# Patient Record
Sex: MALE | Race: WHITE | NOT HISPANIC OR LATINO | Employment: UNEMPLOYED | URBAN - METROPOLITAN AREA
[De-identification: names, ages, dates, MRNs, and addresses within clinical notes are randomized per-mention and may not be internally consistent; named-entity substitution may affect disease eponyms.]

---

## 2017-02-16 ENCOUNTER — TELEPHONE (OUTPATIENT)
Dept: NEUROSURGERY | Facility: CLINIC | Age: 67
End: 2017-02-16

## 2017-02-16 DIAGNOSIS — I65.23 BILATERAL CAROTID ARTERY STENOSIS: Primary | ICD-10-CM

## 2017-02-16 NOTE — TELEPHONE ENCOUNTER
Provider:  Not in EPIC  Caller:   Message from Kristin  Phone #:  [phone]. [phone]  Surgery:  no  Surgery Date: no   Last visit:   Not in [facility]    MAYRA:         Reason for call:           ----- Message from Reba Gonzalez sent at 2/16/2017  1:55 PM EST -----  NEED ORDER FOR YEARLY FI CAROTID US.  THANKS

## 2017-03-10 RX ORDER — LISINOPRIL 5 MG/1
5 TABLET ORAL DAILY
COMMUNITY

## 2017-03-10 RX ORDER — ASPIRIN 81 MG/1
81 TABLET ORAL DAILY
COMMUNITY

## 2017-03-10 RX ORDER — AMLODIPINE BESYLATE 10 MG/1
10 TABLET ORAL DAILY
COMMUNITY
End: 2017-03-13

## 2017-03-10 RX ORDER — ATORVASTATIN CALCIUM 40 MG/1
40 TABLET, FILM COATED ORAL DAILY
COMMUNITY
End: 2017-03-13

## 2017-03-13 ENCOUNTER — HOSPITAL ENCOUNTER (OUTPATIENT)
Dept: CARDIOLOGY | Facility: HOSPITAL | Age: 67
Discharge: HOME | End: 2017-03-13
Admitting: PHYSICIAN ASSISTANT

## 2017-03-13 ENCOUNTER — OFFICE VISIT (OUTPATIENT)
Dept: NEUROSURGERY | Facility: CLINIC | Age: 67
End: 2017-03-13

## 2017-03-13 VITALS
HEIGHT: 72 IN | WEIGHT: 199.8 LBS | RESPIRATION RATE: 20 BRPM | SYSTOLIC BLOOD PRESSURE: 116 MMHG | OXYGEN SATURATION: 98 % | TEMPERATURE: 99.1 F | HEART RATE: 88 BPM | BODY MASS INDEX: 27.06 KG/M2 | DIASTOLIC BLOOD PRESSURE: 78 MMHG

## 2017-03-13 DIAGNOSIS — I65.23 BILATERAL CAROTID ARTERY STENOSIS: ICD-10-CM

## 2017-03-13 DIAGNOSIS — I65.21 STENOSIS OF RIGHT CAROTID ARTERY: Primary | ICD-10-CM

## 2017-03-13 LAB
BH CV XLRA MEAS LEFT DIST CCA EDV: 29.1 CM/SEC
BH CV XLRA MEAS LEFT DIST CCA PSV: 87.2 CM/SEC
BH CV XLRA MEAS LEFT DIST ICA EDV: 47.1 CM/SEC
BH CV XLRA MEAS LEFT DIST ICA PSV: 116 CM/SEC
BH CV XLRA MEAS LEFT ICA/CCA RATIO: 1.1
BH CV XLRA MEAS LEFT MID ICA EDV: 44 CM/SEC
BH CV XLRA MEAS LEFT MID ICA PSV: 102 CM/SEC
BH CV XLRA MEAS LEFT PROX CCA EDV: 33 CM/SEC
BH CV XLRA MEAS LEFT PROX CCA PSV: 101 CM/SEC
BH CV XLRA MEAS LEFT PROX ECA EDV: 19.6 CM/SEC
BH CV XLRA MEAS LEFT PROX ECA PSV: 110 CM/SEC
BH CV XLRA MEAS LEFT PROX ICA EDV: 35.4 CM/SEC
BH CV XLRA MEAS LEFT PROX ICA PSV: 91.9 CM/SEC
BH CV XLRA MEAS LEFT PROX SCLA PSV: 134 CM/SEC
BH CV XLRA MEAS LEFT VERTEBRAL A EDV: 9.4 CM/SEC
BH CV XLRA MEAS LEFT VERTEBRAL A PSV: 31.4 CM/SEC
BH CV XLRA MEAS RIGHT DIST CCA EDV: 33.8 CM/SEC
BH CV XLRA MEAS RIGHT DIST CCA PSV: 108 CM/SEC
BH CV XLRA MEAS RIGHT DIST ICA EDV: 33 CM/SEC
BH CV XLRA MEAS RIGHT DIST ICA PSV: 84.1 CM/SEC
BH CV XLRA MEAS RIGHT ICA/CCA RATIO: 0.7
BH CV XLRA MEAS RIGHT MID ICA EDV: 27.5 CM/SEC
BH CV XLRA MEAS RIGHT MID ICA PSV: 69.1 CM/SEC
BH CV XLRA MEAS RIGHT PROX CCA EDV: 22 CM/SEC
BH CV XLRA MEAS RIGHT PROX CCA PSV: 104 CM/SEC
BH CV XLRA MEAS RIGHT PROX ECA EDV: 22 CM/SEC
BH CV XLRA MEAS RIGHT PROX ECA PSV: 108 CM/SEC
BH CV XLRA MEAS RIGHT PROX ICA EDV: 30.6 CM/SEC
BH CV XLRA MEAS RIGHT PROX ICA PSV: 79.4 CM/SEC
BH CV XLRA MEAS RIGHT PROX SCLA PSV: 132 CM/SEC
BH CV XLRA MEAS RIGHT VERTEBRAL A EDV: 14.1 CM/SEC
BH CV XLRA MEAS RIGHT VERTEBRAL A PSV: 31.4 CM/SEC
LEFT ARM BP: NORMAL MMHG
RIGHT ARM BP: NORMAL MMHG

## 2017-03-13 PROCEDURE — 93880 EXTRACRANIAL BILAT STUDY: CPT

## 2017-03-13 PROCEDURE — 93880 EXTRACRANIAL BILAT STUDY: CPT | Performed by: INTERNAL MEDICINE

## 2017-03-13 PROCEDURE — 99212 OFFICE O/P EST SF 10 MIN: CPT | Performed by: NEUROLOGICAL SURGERY

## 2017-03-13 RX ORDER — HYDROCODONE BITARTRATE AND ACETAMINOPHEN 7.5; 325 MG/1; MG/1
TABLET ORAL
Refills: 0 | COMMUNITY
Start: 2017-03-07

## 2017-03-13 RX ORDER — CLARITHROMYCIN 500 MG/1
TABLET, COATED ORAL
Refills: 0 | COMMUNITY
Start: 2017-02-27 | End: 2017-03-13

## 2017-03-13 RX ORDER — POLYETHYLENE GLYCOL 3350 17 G/17G
POWDER, FOR SOLUTION ORAL
Refills: 0 | COMMUNITY
Start: 2017-02-06 | End: 2017-03-13

## 2017-03-13 RX ORDER — BISACODYL 5 MG
TABLET, DELAYED RELEASE (ENTERIC COATED) ORAL
Refills: 0 | COMMUNITY
Start: 2017-02-06 | End: 2017-03-13

## 2017-03-13 RX ORDER — ATORVASTATIN CALCIUM 10 MG/1
TABLET, FILM COATED ORAL
Refills: 6 | COMMUNITY
Start: 2017-03-07 | End: 2023-03-13

## 2017-03-13 RX ORDER — QUETIAPINE FUMARATE 50 MG/1
TABLET, FILM COATED ORAL
Refills: 3 | COMMUNITY
Start: 2017-01-06

## 2017-03-13 RX ORDER — AMOXICILLIN 500 MG/1
CAPSULE ORAL
Refills: 0 | COMMUNITY
Start: 2017-02-27 | End: 2017-03-13

## 2017-03-13 RX ORDER — OMEPRAZOLE 20 MG/1
CAPSULE, DELAYED RELEASE ORAL
Refills: 0 | COMMUNITY
Start: 2017-02-27 | End: 2017-03-13

## 2017-03-13 NOTE — PROGRESS NOTES
NAME: CARMEN GRANT   DOS: 3/13/2017  : 1950  PCP: David Frank MD    Chief Complaint:  No chief complaint on file.    Follow-up for likely symptomatic right carotid stenosis  History of Present Illness:  67 y.o. male     Gentleman's a follow-up from a right-sided carotid endarterectomy that was unremarkable from over a year ago.  He had some visual complaints at that time but nothing that was completely classic for TIA.  He had hematuria as well related to a urinary cancer.    He has no new symptoms here for follow-up with an ultrasound  PMHX  Allergies:  Allergies no known allergies  Medications    Current Outpatient Prescriptions:   •  amLODIPine (NORVASC) 10 MG tablet, Take 10 mg by mouth Daily., Disp: , Rfl:   •  aspirin 81 MG EC tablet, Take 81 mg by mouth Daily., Disp: , Rfl:   •  atorvastatin (LIPITOR) 40 MG tablet, Take 40 mg by mouth Daily., Disp: , Rfl:   •  lisinopril (PRINIVIL,ZESTRIL) 5 MG tablet, Take 5 mg by mouth Daily., Disp: , Rfl:   Past Medical History:  Past Medical History   Diagnosis Date   • Arthritis    • Hearing loss    • Hypertension      Past Surgical History:  Past Surgical History   Procedure Laterality Date   • Carotid endarterectomy  2015     (Dr. Piña)     Social Hx:  Social History   Substance Use Topics   • Smoking status: Never Smoker   • Smokeless tobacco: Never Used   • Alcohol use Yes     Family Hx:  No family history on file.  Review of Systems:        Review of Systems   Constitutional: Negative for activity change, appetite change, chills, diaphoresis, fatigue, fever and unexpected weight change.   HENT: Positive for hearing loss and trouble swallowing. Negative for congestion, dental problem, drooling, ear discharge, ear pain, facial swelling, mouth sores, nosebleeds, postnasal drip, rhinorrhea, sinus pressure, sneezing, sore throat, tinnitus and voice change.    Eyes: Positive for visual disturbance. Negative for photophobia, pain, discharge,  redness and itching.   Respiratory: Negative for apnea, cough, choking, chest tightness, shortness of breath, wheezing and stridor.    Cardiovascular: Negative for chest pain, palpitations and leg swelling.   Gastrointestinal: Negative for abdominal distention, abdominal pain, anal bleeding, blood in stool, constipation, diarrhea, nausea, rectal pain and vomiting.   Endocrine: Negative for cold intolerance, heat intolerance, polydipsia, polyphagia and polyuria.   Genitourinary: Positive for flank pain. Negative for decreased urine volume, difficulty urinating, dysuria, enuresis, frequency, genital sores, hematuria and urgency.   Musculoskeletal: Positive for arthralgias, gait problem, joint swelling, myalgias and neck pain. Negative for back pain and neck stiffness.   Skin: Negative for color change, pallor, rash and wound.   Allergic/Immunologic: Negative for environmental allergies, food allergies and immunocompromised state.   Neurological: Positive for numbness. Negative for dizziness, tremors, seizures, syncope, facial asymmetry, speech difficulty, weakness, light-headedness and headaches.   Hematological: Negative for adenopathy. Does not bruise/bleed easily.   Psychiatric/Behavioral: Positive for sleep disturbance. Negative for agitation, behavioral problems, confusion, decreased concentration, dysphoric mood, hallucinations, self-injury and suicidal ideas. The patient is not nervous/anxious and is not hyperactive.             Physical Examination:  There were no vitals filed for this visit.   General Appearance:   Well developed, well nourished, well groomed, alert, and cooperative.  Neurological examination:  Neurologic Exam  He's awake alert and follows commands is edentulous hard of hearing pupils symmetric  Tongue midline  No evidence of motor drift  Gait is normal incision well-healed  Negative straight leg raise sign 5 out of 5 strength    Review of Imaging/DATA:  Carotid ultrasounds demonstrate  velocities of approximately 100 with ratios approximately 1   Diagnoses/Plan:    Mr. Rhodes is a 67 y.o. male patient doing well status post endarterectomy no new symptomatology from a neurologic standpoint he does have a complaint of a mild sciatica on the left it is very subtle but I did  him on the signs and symptoms to look for to necessitate a referral back.  From endarterectomy standpoint he is to continue statin aspirin and the see him back in a couple years with a follow-up ultrasound.

## 2019-02-18 ENCOUNTER — TELEPHONE (OUTPATIENT)
Dept: NEUROSURGERY | Facility: CLINIC | Age: 69
End: 2019-02-18

## 2019-02-18 DIAGNOSIS — I65.23 BILATERAL CAROTID ARTERY STENOSIS: Primary | ICD-10-CM

## 2019-02-18 NOTE — TELEPHONE ENCOUNTER
Patient called to get scheduled for a F/u. The order in the system has . Can someone put a new one in for me?     Order -  Carotid Duplex    DX -  Stenosis of right carotid artery

## 2019-03-14 ENCOUNTER — OFFICE VISIT (OUTPATIENT)
Dept: NEUROSURGERY | Facility: CLINIC | Age: 69
End: 2019-03-14

## 2019-03-14 ENCOUNTER — HOSPITAL ENCOUNTER (OUTPATIENT)
Dept: CARDIOLOGY | Facility: HOSPITAL | Age: 69
Discharge: HOME | End: 2019-03-14
Admitting: PHYSICIAN ASSISTANT

## 2019-03-14 VITALS
HEIGHT: 72 IN | SYSTOLIC BLOOD PRESSURE: 112 MMHG | DIASTOLIC BLOOD PRESSURE: 68 MMHG | WEIGHT: 195 LBS | TEMPERATURE: 98.5 F | BODY MASS INDEX: 26.41 KG/M2

## 2019-03-14 VITALS — HEIGHT: 72 IN | WEIGHT: 199 LBS | BODY MASS INDEX: 26.95 KG/M2

## 2019-03-14 DIAGNOSIS — Z98.890 H/O CAROTID ENDARTERECTOMY: ICD-10-CM

## 2019-03-14 DIAGNOSIS — I65.23 BILATERAL CAROTID ARTERY STENOSIS: Primary | ICD-10-CM

## 2019-03-14 LAB
BH CV XLRA MEAS LEFT DIST CCA EDV: 22.8 CM/SEC
BH CV XLRA MEAS LEFT DIST CCA PSV: 79.8 CM/SEC
BH CV XLRA MEAS LEFT DIST ICA EDV: 38 CM/SEC
BH CV XLRA MEAS LEFT DIST ICA PSV: 88.8 CM/SEC
BH CV XLRA MEAS LEFT ICA/CCA RATIO: 1.19
BH CV XLRA MEAS LEFT MID CCA EDV: 29.5 CM/SEC
BH CV XLRA MEAS LEFT MID CCA PSV: 96.2 CM/SEC
BH CV XLRA MEAS LEFT MID ICA EDV: 51.8 CM/SEC
BH CV XLRA MEAS LEFT MID ICA PSV: 114.7 CM/SEC
BH CV XLRA MEAS LEFT PROX CCA EDV: 40.1 CM/SEC
BH CV XLRA MEAS LEFT PROX CCA PSV: 137.5 CM/SEC
BH CV XLRA MEAS LEFT PROX ECA EDV: 23.2 CM/SEC
BH CV XLRA MEAS LEFT PROX ECA PSV: 102.6 CM/SEC
BH CV XLRA MEAS LEFT PROX ICA EDV: 42.5 CM/SEC
BH CV XLRA MEAS LEFT PROX ICA PSV: 105.3 CM/SEC
BH CV XLRA MEAS LEFT PROX SCLA EDV: 13.4 CM/SEC
BH CV XLRA MEAS LEFT PROX SCLA PSV: 139.9 CM/SEC
BH CV XLRA MEAS LEFT VERTEBRAL A EDV: 11.1 CM/SEC
BH CV XLRA MEAS LEFT VERTEBRAL A PSV: 41 CM/SEC
BH CV XLRA MEAS RIGHT DIST CCA EDV: 31.4 CM/SEC
BH CV XLRA MEAS RIGHT DIST CCA PSV: 106.9 CM/SEC
BH CV XLRA MEAS RIGHT DIST ICA EDV: 30.4 CM/SEC
BH CV XLRA MEAS RIGHT DIST ICA PSV: 86.9 CM/SEC
BH CV XLRA MEAS RIGHT ICA/CCA RATIO: 0.75
BH CV XLRA MEAS RIGHT MID CCA EDV: 28.9 CM/SEC
BH CV XLRA MEAS RIGHT MID CCA PSV: 113.8 CM/SEC
BH CV XLRA MEAS RIGHT MID ICA EDV: 33.4 CM/SEC
BH CV XLRA MEAS RIGHT MID ICA PSV: 85 CM/SEC
BH CV XLRA MEAS RIGHT PROX CCA EDV: 23.3 CM/SEC
BH CV XLRA MEAS RIGHT PROX CCA PSV: 99.3 CM/SEC
BH CV XLRA MEAS RIGHT PROX ECA EDV: 24.5 CM/SEC
BH CV XLRA MEAS RIGHT PROX ECA PSV: 120.7 CM/SEC
BH CV XLRA MEAS RIGHT PROX ICA EDV: 19.2 CM/SEC
BH CV XLRA MEAS RIGHT PROX ICA PSV: 73.7 CM/SEC
BH CV XLRA MEAS RIGHT PROX SCLA EDV: 0 CM/SEC
BH CV XLRA MEAS RIGHT PROX SCLA PSV: 217 CM/SEC
BH CV XLRA MEAS RIGHT VERTEBRAL A EDV: 14.3 CM/SEC
BH CV XLRA MEAS RIGHT VERTEBRAL A PSV: 34.7 CM/SEC
LEFT ARM BP: NORMAL MMHG
RIGHT ARM BP: NORMAL MMHG

## 2019-03-14 PROCEDURE — 99212 OFFICE O/P EST SF 10 MIN: CPT | Performed by: NEUROLOGICAL SURGERY

## 2019-03-14 PROCEDURE — 93880 EXTRACRANIAL BILAT STUDY: CPT

## 2019-03-14 PROCEDURE — 93880 EXTRACRANIAL BILAT STUDY: CPT | Performed by: INTERNAL MEDICINE

## 2019-03-14 NOTE — PROGRESS NOTES
NAME: CARMEN GRANT   DOS: 3/14/2019  : 1950  PCP: David Frank MD    Chief Complaint:    Chief Complaint   Patient presents with   • HX OF RT- CEA     F/U W/ DUPLEX       History of Present Illness:  69 y.o. male   Gentleman's a follow-up from a right-sided carotid endarterectomy that was unremarkable from over a year ago.  He had some visual complaints at that time but nothing that was completely classic for TIA.  He had hematuria as well related to a urinary cancer.     He has no new symptoms here for follow-up with an ultrasound    No new signs of weakness or problems with strokelike symptoms he is on aspirin and Lipitor  PMHX    PMHX  Allergies:  No Known Allergies  Medications    Current Outpatient Medications:   •  aspirin 81 MG EC tablet, Take 81 mg by mouth Daily., Disp: , Rfl:   •  atorvastatin (LIPITOR) 10 MG tablet, TAKE ONE TABLET BY MOUTH EVERY DAY, Disp: , Rfl: 6  •  HYDROcodone-acetaminophen (NORCO) 7.5-325 MG per tablet, TAKE ONE TABLET BY MOUTH THREE TIMES DAILY AS NEEDED, Disp: , Rfl: 0  •  lisinopril (PRINIVIL,ZESTRIL) 5 MG tablet, Take 5 mg by mouth Daily., Disp: , Rfl:   •  QUEtiapine (SEROquel) 50 MG tablet, TAKE ONE TABLET BY MOUTH EVERY DAY, Disp: , Rfl: 3  Past Medical History:  Past Medical History:   Diagnosis Date   • Arthritis    • H. pylori infection    • Hearing loss    • Hepatitis    • Hypertension    • Prostate cancer (CMS/HCC)      Past Surgical History:  Past Surgical History:   Procedure Laterality Date   • CAROTID ENDARTERECTOMY  2015    (Dr. Piña)   • CAROTID ENDARTERECTOMY  2015    (Dr. Piña)   • COLONOSCOPY       Social Hx:  Social History     Tobacco Use   • Smoking status: Never Smoker   • Smokeless tobacco: Never Used   Substance Use Topics   • Alcohol use: Yes   • Drug use: No     Family Hx:  Family History   Problem Relation Age of Onset   • Breast cancer Mother    • Heart disease Mother    • Heart disease Father    • COPD Father       Review of Systems:        Review of Systems   Constitutional: Negative for activity change, appetite change, chills, diaphoresis, fatigue, fever and unexpected weight change.   HENT: Positive for congestion and rhinorrhea. Negative for dental problem, drooling, ear discharge, ear pain, facial swelling, hearing loss, mouth sores, nosebleeds, postnasal drip, sinus pressure, sneezing, sore throat, tinnitus, trouble swallowing and voice change.    Eyes: Negative for photophobia, pain, discharge, redness, itching and visual disturbance.   Respiratory: Positive for cough. Negative for apnea, choking, chest tightness, shortness of breath, wheezing and stridor.    Cardiovascular: Negative for chest pain, palpitations and leg swelling.   Gastrointestinal: Negative for abdominal distention, abdominal pain, anal bleeding, blood in stool, constipation, diarrhea, nausea, rectal pain and vomiting.   Endocrine: Negative for cold intolerance, heat intolerance, polydipsia, polyphagia and polyuria.   Genitourinary: Negative for decreased urine volume, difficulty urinating, dysuria, enuresis, flank pain, frequency, genital sores, hematuria and urgency.   Musculoskeletal: Positive for back pain. Negative for arthralgias, gait problem, joint swelling, myalgias, neck pain and neck stiffness.   Skin: Negative for color change, pallor, rash and wound.   Allergic/Immunologic: Positive for environmental allergies. Negative for food allergies and immunocompromised state.   Neurological: Negative for dizziness, tremors, seizures, syncope, facial asymmetry, speech difficulty, weakness, light-headedness, numbness and headaches.   Hematological: Negative for adenopathy. Does not bruise/bleed easily.   Psychiatric/Behavioral: Negative for agitation, behavioral problems, confusion, decreased concentration, dysphoric mood, hallucinations, self-injury, sleep disturbance and suicidal ideas. The patient is not nervous/anxious and is not  hyperactive.             Physical Examination:  Vitals:    03/14/19 1242   BP: 112/68   Temp: 98.5 °F (36.9 °C)      General Appearance:   Well developed, well nourished, well groomed, alert, and cooperative.  Neurological examination:  Neurologic Exam  His incision on the right neck is well-healed    He is awake alert and follows commands he has no motor drift his gait is normal  Review of Imaging/DATA:  I reviewed his ultrasound that shows low velocities  Diagnoses/Plan:    Mr. Rhodes is a 69 y.o. male   Lipitor.  I counseled him on the signs and symptoms to look for to necessitate a stroke follow-up.  Overall his carotid ultrasounds look much improved his velocities were in excess of 300 pre-surgically he is doing well plan will be for follow-up ultrasound in 2 years continue aspirin and

## 2021-02-05 ENCOUNTER — TELEPHONE (OUTPATIENT)
Dept: NEUROSURGERY | Facility: CLINIC | Age: 71
End: 2021-02-05

## 2021-02-05 DIAGNOSIS — I65.23 BILATERAL CAROTID ARTERY STENOSIS: Primary | ICD-10-CM

## 2021-02-05 DIAGNOSIS — Z98.890 H/O CAROTID ENDARTERECTOMY: ICD-10-CM

## 2021-02-05 NOTE — TELEPHONE ENCOUNTER
Ultrasound and follow up scheduled. I spoke to Mr. Rhodes. Appointment date and times provided. Appointment reminders and paperwork packet (to update chart) mailed.

## 2021-02-05 NOTE — TELEPHONE ENCOUNTER
Provider:  Wang  Caller:  patient  Time of call:  10:00am  Phone #:  [phone]  Surgery:    Surgery Date:    Last visit:   3/24/19  Next visit:     MAYRA:         Reason for call:   Patient called to schedule his 2-yr follow up with ultrasound. Please pend an order for the carotid ultrasound.

## 2021-03-04 ENCOUNTER — HOSPITAL ENCOUNTER (OUTPATIENT)
Dept: CARDIOLOGY | Facility: HOSPITAL | Age: 71
Discharge: HOME | End: 2021-03-04
Admitting: PHYSICIAN ASSISTANT

## 2021-03-04 ENCOUNTER — OFFICE VISIT (OUTPATIENT)
Dept: NEUROSURGERY | Facility: CLINIC | Age: 71
End: 2021-03-04

## 2021-03-04 VITALS
DIASTOLIC BLOOD PRESSURE: 72 MMHG | HEIGHT: 72 IN | BODY MASS INDEX: 27.25 KG/M2 | SYSTOLIC BLOOD PRESSURE: 136 MMHG | TEMPERATURE: 97.7 F | WEIGHT: 201.2 LBS

## 2021-03-04 DIAGNOSIS — I65.23 BILATERAL CAROTID ARTERY STENOSIS: Primary | ICD-10-CM

## 2021-03-04 DIAGNOSIS — Z98.890 H/O CAROTID ENDARTERECTOMY: ICD-10-CM

## 2021-03-04 DIAGNOSIS — I65.23 BILATERAL CAROTID ARTERY STENOSIS: ICD-10-CM

## 2021-03-04 LAB
BH CV ECHO MEAS - BSA(HAYCOCK): 2.1 M^2
BH CV ECHO MEAS - BSA: 2.1 M^2
BH CV ECHO MEAS - BZI_BMI: 26.4 KILOGRAMS/M^2
BH CV ECHO MEAS - BZI_METRIC_HEIGHT: 182.9 CM
BH CV ECHO MEAS - BZI_METRIC_WEIGHT: 88.5 KG
BH CV XLRA MEAS LEFT DIST CCA EDV: 23.6 CM/SEC
BH CV XLRA MEAS LEFT DIST CCA PSV: 89.6 CM/SEC
BH CV XLRA MEAS LEFT DIST ICA EDV: 49.1 CM/SEC
BH CV XLRA MEAS LEFT DIST ICA PSV: 118.8 CM/SEC
BH CV XLRA MEAS LEFT ICA/CCA RATIO: 1.24
BH CV XLRA MEAS LEFT MID CCA EDV: 27.5 CM/SEC
BH CV XLRA MEAS LEFT MID CCA PSV: 102.9 CM/SEC
BH CV XLRA MEAS LEFT MID ICA EDV: 47.8 CM/SEC
BH CV XLRA MEAS LEFT MID ICA PSV: 127.6 CM/SEC
BH CV XLRA MEAS LEFT PROX CCA EDV: 32.2 CM/SEC
BH CV XLRA MEAS LEFT PROX CCA PSV: 118.6 CM/SEC
BH CV XLRA MEAS LEFT PROX ECA EDV: 20.6 CM/SEC
BH CV XLRA MEAS LEFT PROX ECA PSV: 129.6 CM/SEC
BH CV XLRA MEAS LEFT PROX ICA EDV: 30.8 CM/SEC
BH CV XLRA MEAS LEFT PROX ICA PSV: 97.4 CM/SEC
BH CV XLRA MEAS LEFT PROX SCLA PSV: 106.1 CM/SEC
BH CV XLRA MEAS LEFT VERTEBRAL A EDV: 9.8 CM/SEC
BH CV XLRA MEAS LEFT VERTEBRAL A PSV: 47.5 CM/SEC
BH CV XLRA MEAS RIGHT DIST CCA EDV: 20.7 CM/SEC
BH CV XLRA MEAS RIGHT DIST CCA PSV: 100.6 CM/SEC
BH CV XLRA MEAS RIGHT DIST ICA EDV: 28.9 CM/SEC
BH CV XLRA MEAS RIGHT DIST ICA PSV: 103.1 CM/SEC
BH CV XLRA MEAS RIGHT ICA/CCA RATIO: 0.8
BH CV XLRA MEAS RIGHT MID CCA EDV: 27.1 CM/SEC
BH CV XLRA MEAS RIGHT MID CCA PSV: 121.4 CM/SEC
BH CV XLRA MEAS RIGHT MID ICA EDV: 22 CM/SEC
BH CV XLRA MEAS RIGHT MID ICA PSV: 75.4 CM/SEC
BH CV XLRA MEAS RIGHT PROX CCA EDV: 28.8 CM/SEC
BH CV XLRA MEAS RIGHT PROX CCA PSV: 131 CM/SEC
BH CV XLRA MEAS RIGHT PROX ECA EDV: 19.6 CM/SEC
BH CV XLRA MEAS RIGHT PROX ECA PSV: 140.6 CM/SEC
BH CV XLRA MEAS RIGHT PROX ICA EDV: 24.5 CM/SEC
BH CV XLRA MEAS RIGHT PROX ICA PSV: 96.8 CM/SEC
BH CV XLRA MEAS RIGHT PROX SCLA PSV: 193.1 CM/SEC
BH CV XLRA MEAS RIGHT VERTEBRAL A EDV: 21.2 CM/SEC
BH CV XLRA MEAS RIGHT VERTEBRAL A PSV: 63.6 CM/SEC

## 2021-03-04 PROCEDURE — 93880 EXTRACRANIAL BILAT STUDY: CPT | Performed by: INTERNAL MEDICINE

## 2021-03-04 PROCEDURE — 93880 EXTRACRANIAL BILAT STUDY: CPT

## 2021-03-04 PROCEDURE — 99213 OFFICE O/P EST LOW 20 MIN: CPT | Performed by: NEUROLOGICAL SURGERY

## 2021-03-04 RX ORDER — ANTIARTHRITIC COMBINATION NO.2 900 MG
TABLET ORAL
COMMUNITY

## 2021-03-04 NOTE — PROGRESS NOTES
NAME: CARMEN GRANT   DOS: 3/4/2021  : 1950  PCP: David Frank MD    Chief Complaint:    Chief Complaint   Patient presents with   • Carotid Artery Disease       History of Present Illness:  71 y.o. male   71-year-old gentleman status post right CEA patient is doing well no symptoms    PMHX  Allergies:  No Known Allergies  Medications    Current Outpatient Medications:   •  aspirin 81 MG EC tablet, Take 81 mg by mouth Daily., Disp: , Rfl:   •  atorvastatin (LIPITOR) 10 MG tablet, TAKE ONE TABLET BY MOUTH EVERY DAY, Disp: , Rfl: 6  •  Biotin 5000 MCG tablet, Take  by mouth., Disp: , Rfl:   •  HYDROcodone-acetaminophen (NORCO) 7.5-325 MG per tablet, TAKE ONE TABLET BY MOUTH THREE TIMES DAILY AS NEEDED, Disp: , Rfl: 0  •  lisinopril (PRINIVIL,ZESTRIL) 5 MG tablet, Take 5 mg by mouth Daily., Disp: , Rfl:   •  QUEtiapine (SEROquel) 50 MG tablet, TAKE ONE TABLET BY MOUTH EVERY DAY, Disp: , Rfl: 3  Past Medical History:  Past Medical History:   Diagnosis Date   • Arthritis    • H. pylori infection    • Hearing loss    • Hepatitis    • Hypertension    • Low back pain    • Prostate cancer (CMS/HCC)      Past Surgical History:  Past Surgical History:   Procedure Laterality Date   • CAROTID ENDARTERECTOMY  2015    (Dr. Piña)   • CAROTID ENDARTERECTOMY  2015    (Dr. Piña)   • COLONOSCOPY     • PROSTATE SURGERY       Social Hx:  Social History     Tobacco Use   • Smoking status: Never Smoker   • Smokeless tobacco: Never Used   Substance Use Topics   • Alcohol use: Yes   • Drug use: No     Family Hx:  Family History   Problem Relation Age of Onset   • Breast cancer Mother    • Heart disease Mother    • Heart disease Father    • COPD Father      Review of Systems:        Review of Systems   Constitutional: Negative for activity change, appetite change, chills, diaphoresis, fatigue, fever and unexpected weight change.   HENT: Positive for hearing loss. Negative for congestion, dental  problem, drooling, ear discharge, ear pain, facial swelling, mouth sores, nosebleeds, postnasal drip, rhinorrhea, sinus pressure, sinus pain, sneezing, sore throat, tinnitus, trouble swallowing and voice change.    Eyes: Negative for photophobia, pain, discharge, redness, itching and visual disturbance.   Respiratory: Positive for shortness of breath. Negative for apnea, cough, choking, chest tightness, wheezing and stridor.    Cardiovascular: Negative for chest pain, palpitations and leg swelling.   Gastrointestinal: Positive for constipation. Negative for abdominal distention, abdominal pain, anal bleeding, blood in stool, diarrhea, nausea, rectal pain and vomiting.   Endocrine: Negative for cold intolerance, heat intolerance, polydipsia, polyphagia and polyuria.   Genitourinary: Negative for decreased urine volume, difficulty urinating, discharge, dysuria, enuresis, flank pain, frequency, genital sores, hematuria, penile pain, penile swelling, scrotal swelling, testicular pain and urgency.   Musculoskeletal: Positive for back pain, joint swelling and neck pain. Negative for arthralgias, gait problem, myalgias and neck stiffness.   Skin: Negative for color change, pallor, rash and wound.   Allergic/Immunologic: Negative for environmental allergies, food allergies and immunocompromised state.   Neurological: Negative for dizziness, tremors, seizures, syncope, facial asymmetry, speech difficulty, weakness, light-headedness, numbness and headaches.   Hematological: Negative for adenopathy. Does not bruise/bleed easily.   Psychiatric/Behavioral: Positive for sleep disturbance. Negative for agitation, behavioral problems, confusion, decreased concentration, dysphoric mood, hallucinations, self-injury and suicidal ideas. The patient is not nervous/anxious and is not hyperactive.       I have reviewed this note template and all pertinent parts of the review of systems social, family history, surgical history and  medication list      Physical Examination:  Vitals:    03/04/21 1510   BP: 136/72   Temp:       General Appearance:   Well developed, well nourished, well groomed, alert, and cooperative.  Neurological examination:  Neurologic Exam  Patient is awake alert follows commands doing very well    Strong voice    Well-healed incision on the right    Moves arms and legs symmetrically independent ambulation speech is strong edentulous    Review of Imaging/DATA:  Reviewed his carotid ultrasound velocities are still low  Diagnoses/Plan:    Mr. Rhodes is a 71 y.o. male \  Patient was 71-year-old male history of status post right-sided endarterectomy doing well no evidence of recurrent symptomatology carotid duplex does demonstrate low velocities    Plan follow-up ultrasound in 2 to 3 years given stability continue current maximal medical therapy for stroke prevention

## 2022-09-16 ENCOUNTER — OUTSIDE FACILITY SERVICE (OUTPATIENT)
Dept: CARDIOLOGY | Facility: CLINIC | Age: 72
End: 2022-09-16

## 2022-09-16 PROCEDURE — 93227 XTRNL ECG REC<48 HR R&I: CPT | Performed by: INTERNAL MEDICINE

## 2023-02-08 ENCOUNTER — TELEPHONE (OUTPATIENT)
Dept: NEUROSURGERY | Facility: CLINIC | Age: 73
End: 2023-02-08
Payer: MEDICARE

## 2023-02-08 DIAGNOSIS — I65.23 BILATERAL CAROTID ARTERY STENOSIS: Primary | ICD-10-CM

## 2023-02-08 NOTE — TELEPHONE ENCOUNTER
Caller: Jurgen Rhodes    Relationship to patient: Self    Best call back number:[phone]    Patient is needing:     PATIENT CALLED TO SCHEDULE F/U APPT, WOULD LIKE TO SCHEDULE 1ST OR 2ND WEEK OF MARCH BECAUSE HE IS GOING OUT OF TOWN.    IT IS NOTED THAT U/S IS NEEDED PRIOR TO APPT, WILL NEED ORDER BEFORE SCHEDULING    PLEASE CALL PATIENT TO ADVISE

## 2023-02-09 NOTE — TELEPHONE ENCOUNTER
Patient is scheduled for scan and follow up.  He has been notified via telephone and mail per his request

## 2023-03-13 ENCOUNTER — HOSPITAL ENCOUNTER (OUTPATIENT)
Dept: CARDIOLOGY | Facility: HOSPITAL | Age: 73
Discharge: HOME | End: 2023-03-13
Admitting: NEUROLOGICAL SURGERY
Payer: MEDICARE

## 2023-03-13 ENCOUNTER — OFFICE VISIT (OUTPATIENT)
Dept: NEUROSURGERY | Facility: CLINIC | Age: 73
End: 2023-03-13
Payer: MEDICARE

## 2023-03-13 VITALS
WEIGHT: 191.8 LBS | SYSTOLIC BLOOD PRESSURE: 146 MMHG | DIASTOLIC BLOOD PRESSURE: 78 MMHG | HEIGHT: 72 IN | BODY MASS INDEX: 25.98 KG/M2 | TEMPERATURE: 97.1 F

## 2023-03-13 DIAGNOSIS — I65.23 BILATERAL CAROTID ARTERY STENOSIS: Primary | ICD-10-CM

## 2023-03-13 LAB
BH CV XLRA MEAS LEFT CAROTID BULB EDV: 61.1 CM/SEC
BH CV XLRA MEAS LEFT CAROTID BULB PSV: 132 CM/SEC
BH CV XLRA MEAS LEFT DIST CCA EDV: 32.9 CM/SEC
BH CV XLRA MEAS LEFT DIST CCA PSV: 91.8 CM/SEC
BH CV XLRA MEAS LEFT DIST ICA EDV: 47.8 CM/SEC
BH CV XLRA MEAS LEFT DIST ICA PSV: 98 CM/SEC
BH CV XLRA MEAS LEFT ICA/CCA RATIO: 1.7
BH CV XLRA MEAS LEFT MID CCA EDV: 39.7 CM/SEC
BH CV XLRA MEAS LEFT MID CCA PSV: 113 CM/SEC
BH CV XLRA MEAS LEFT MID ICA EDV: 63.7 CM/SEC
BH CV XLRA MEAS LEFT MID ICA PSV: 175 CM/SEC
BH CV XLRA MEAS LEFT PROX CCA EDV: 42.8 CM/SEC
BH CV XLRA MEAS LEFT PROX CCA PSV: 123 CM/SEC
BH CV XLRA MEAS LEFT PROX ECA EDV: 17.1 CM/SEC
BH CV XLRA MEAS LEFT PROX ECA PSV: 113 CM/SEC
BH CV XLRA MEAS LEFT PROX ICA EDV: 71.9 CM/SEC
BH CV XLRA MEAS LEFT PROX ICA PSV: 191 CM/SEC
BH CV XLRA MEAS LEFT PROX SCLA PSV: 103 CM/SEC
BH CV XLRA MEAS LEFT VERTEBRAL A EDV: 12.4 CM/SEC
BH CV XLRA MEAS LEFT VERTEBRAL A PSV: 57.2 CM/SEC
BH CV XLRA MEAS RIGHT DIST CCA EDV: 24.9 CM/SEC
BH CV XLRA MEAS RIGHT DIST CCA PSV: 81.4 CM/SEC
BH CV XLRA MEAS RIGHT DIST ICA EDV: 54.6 CM/SEC
BH CV XLRA MEAS RIGHT DIST ICA PSV: 137 CM/SEC
BH CV XLRA MEAS RIGHT ICA/CCA RATIO: 1.6
BH CV XLRA MEAS RIGHT MID CCA EDV: 23.6 CM/SEC
BH CV XLRA MEAS RIGHT MID CCA PSV: 84.5 CM/SEC
BH CV XLRA MEAS RIGHT MID ICA EDV: 30 CM/SEC
BH CV XLRA MEAS RIGHT MID ICA PSV: 74.2 CM/SEC
BH CV XLRA MEAS RIGHT PROX CCA EDV: 21.1 CM/SEC
BH CV XLRA MEAS RIGHT PROX CCA PSV: 95.1 CM/SEC
BH CV XLRA MEAS RIGHT PROX ECA EDV: 16.5 CM/SEC
BH CV XLRA MEAS RIGHT PROX ECA PSV: 76.3 CM/SEC
BH CV XLRA MEAS RIGHT PROX ICA EDV: 16.7 CM/SEC
BH CV XLRA MEAS RIGHT PROX ICA PSV: 50 CM/SEC
BH CV XLRA MEAS RIGHT PROX SCLA PSV: 101 CM/SEC
BH CV XLRA MEAS RIGHT VERTEBRAL A EDV: 29.5 CM/SEC
BH CV XLRA MEAS RIGHT VERTEBRAL A PSV: 82.3 CM/SEC
LEFT ARM BP: NORMAL MMHG
MAXIMAL PREDICTED HEART RATE: 147 BPM
RIGHT ARM BP: NORMAL MMHG
STRESS TARGET HR: 125 BPM

## 2023-03-13 PROCEDURE — 93880 EXTRACRANIAL BILAT STUDY: CPT

## 2023-03-13 PROCEDURE — 93880 EXTRACRANIAL BILAT STUDY: CPT | Performed by: INTERNAL MEDICINE

## 2023-03-13 PROCEDURE — 99214 OFFICE O/P EST MOD 30 MIN: CPT | Performed by: PHYSICIAN ASSISTANT

## 2023-03-13 RX ORDER — ATORVASTATIN CALCIUM 80 MG/1
80 TABLET, FILM COATED ORAL DAILY
Qty: 90 TABLET | Refills: 6 | Status: SHIPPED | OUTPATIENT
Start: 2023-03-13 | End: 2023-03-13 | Stop reason: SDUPTHER

## 2025-05-06 ENCOUNTER — OFFICE VISIT (OUTPATIENT)
Dept: CARDIOLOGY | Facility: CLINIC | Age: 75
End: 2025-05-06
Payer: MEDICARE

## 2025-05-06 VITALS
SYSTOLIC BLOOD PRESSURE: 120 MMHG | DIASTOLIC BLOOD PRESSURE: 72 MMHG | HEIGHT: 72 IN | WEIGHT: 179 LBS | HEART RATE: 61 BPM | BODY MASS INDEX: 24.24 KG/M2

## 2025-05-06 DIAGNOSIS — R06.02 SHORTNESS OF BREATH: ICD-10-CM

## 2025-05-06 DIAGNOSIS — R07.89 CHEST TIGHTNESS: Primary | ICD-10-CM

## 2025-05-06 DIAGNOSIS — I10 PRIMARY HYPERTENSION: ICD-10-CM

## 2025-05-06 DIAGNOSIS — E78.00 HYPERCHOLESTEROLEMIA: ICD-10-CM

## 2025-05-06 DIAGNOSIS — I65.23 BILATERAL CAROTID ARTERY STENOSIS: ICD-10-CM

## 2025-05-06 PROCEDURE — 3078F DIAST BP <80 MM HG: CPT | Performed by: INTERNAL MEDICINE

## 2025-05-06 PROCEDURE — 99204 OFFICE O/P NEW MOD 45 MIN: CPT | Performed by: INTERNAL MEDICINE

## 2025-05-06 PROCEDURE — 1160F RVW MEDS BY RX/DR IN RCRD: CPT | Performed by: INTERNAL MEDICINE

## 2025-05-06 PROCEDURE — 93000 ELECTROCARDIOGRAM COMPLETE: CPT | Performed by: INTERNAL MEDICINE

## 2025-05-06 PROCEDURE — 3074F SYST BP LT 130 MM HG: CPT | Performed by: INTERNAL MEDICINE

## 2025-05-06 PROCEDURE — 1159F MED LIST DOCD IN RCRD: CPT | Performed by: INTERNAL MEDICINE

## 2025-05-06 RX ORDER — ATORVASTATIN CALCIUM 10 MG/1
10 TABLET, FILM COATED ORAL DAILY
COMMUNITY

## 2025-05-06 RX ORDER — LORATADINE 10 MG/1
10 TABLET ORAL DAILY
COMMUNITY

## 2025-05-06 RX ORDER — METOPROLOL TARTRATE 25 MG/1
25 TABLET, FILM COATED ORAL 2 TIMES DAILY
COMMUNITY

## 2025-05-06 RX ORDER — NITROGLYCERIN 0.4 MG/1
TABLET SUBLINGUAL
Qty: 100 TABLET | Refills: 11 | Status: SHIPPED | OUTPATIENT
Start: 2025-05-06

## 2025-05-06 RX ORDER — QUETIAPINE 200 MG/1
200 TABLET, FILM COATED, EXTENDED RELEASE ORAL NIGHTLY
COMMUNITY

## 2025-05-06 NOTE — PROGRESS NOTES
"Chief Complaint   Patient presents with   • Establish Care     PCP referred, chest pain.    • Chest Pain     Episode in February, had mold in his house. Washed it off with bleach. The next day was having a lot of chest pressure with SOB. Since then he has not been able to do any exertion without developing chest pressure that radiates to his left back and shoulder.  SOB noted.    • Shortness of Breath     Now occurring with any exertion.    • Cardiac history     Wore monitor in 2022, echo in 2015, results in chart. Several carotid US's. Results also in chart.    • Palpitations     Had been having a lot of palpitations a few years ago, PCP ordered a monitor and added metoprolol tart 25 mg BID. Really helped the palpitations.    • Labs     Last check was in Dec checking his kidneys that \" shut down\" several years at [facility]. Requesting results.    • Kidney Eval     History of problems with acute kidney failure a few years ago,  Reports under [facility].         CARDIAC COMPLAINTS  chest pressure/discomfort, dyspnea, and palpitations      Subjective   Jurgen Rhodes is a 75 y.o. male came in today for his initial cardiac evaluation.  He has history of hypertension, hypercholesterolemia who also has significant carotid artery disease diagnosed in 2015 when he was found to have severe carotid artery disease and underwent right carotid endarterectomy.  He has been monitoring his carotid ultrasound at least once every other year.  In 2023 the left carotid artery disease seems to have gotten worse.  He has not had any symptoms from that at that time.  He also has been having episodes of palpitation on and off and a Holter monitor showed 2.9% PVC and few short runs of SVT.  Beta-blockers were added and has done fairly well with that.  He also had problem with kidney failure few years ago.  He apparently has been drinking water which was kept in the sun for a long time.  He claims that part because the renal failure but " apparently the symptoms and the failure got better.    He is now referred because he has been having some chest tightness and shortness of breath.  He started in February when he was cleaning some mold in his house and washing it out with bleach.  The next day he was having a lot of chest tightness associated with shortness of breath.  He was not able to do any activities without developing the discomfort.  After about 30 minutes the symptoms subsided.  Since then he has been noticing the same symptoms but not as significant occurring 2 or 3 times a week.  It always occurs on exertion.  In a scale of 1-10 is around 4 of 5.  It normally last about 15 minutes and resolves with resting.  No associated diaphoresis noted.  He is not sure when was the last time he had any labs done.  The last that I have is from 2015.  He has no history of smoking or drinking alcohol.  His father and 2 sisters had some kind of heart disease and one of his brother has heart disease.    Past Surgical History:   Procedure Laterality Date   • CAROTID ENDARTERECTOMY  02/23/2015    (Dr. Piña) (R) carotid endarterectomy   • COLONOSCOPY     • CONVERTED (HISTORICAL) HOLTER  09/16/2022    @ [facility]. 1 day. Avg 67. 47- 120. 2.9% PVC. 4 SVT   • PROSTATE SURGERY  2007   •  CAROTID UNILATERAL  02/23/2015    @ [facility]- > 70% (R) ICA. < 49% (L) ICA   • US CAROTID UNILATERAL  03/13/2017    @ [facility]. < 49 % Both ICA   • US CAROTID UNILATERAL  02/18/2019    @ [facility]. < 49 %. Both ICA   •  CAROTID UNILATERAL  03/04/2021    @ [facility]. < 49 % Both ICA   •  CAROTID UNILATERAL  03/13/2023    @ [facility]. 50-69% (L) ICA       Current Outpatient Medications   Medication Sig Dispense Refill   • aspirin 81 MG EC tablet Take 1 tablet by mouth Daily.     • atorvastatin (LIPITOR) 10 MG tablet Take 1 tablet by mouth Daily.     • Cyanocobalamin (VITAMIN B-12 PO) Take  by mouth Daily.     • HYDROcodone-acetaminophen (NORCO) 7.5-325 MG per tablet TAKE ONE TABLET BY MOUTH THREE TIMES DAILY  AS NEEDED  0   • loratadine (CLARITIN) 10 MG tablet Take 1 tablet by mouth Daily.     • MAGNESIUM PO Take  by mouth Daily.     • metoprolol tartrate (LOPRESSOR) 25 MG tablet Take 1 tablet by mouth 2 (Two) Times a Day.     • Misc Natural Products (BEET ROOT PO) Take  by mouth 2 (Two) Times a Day.     • QUEtiapine XR (SEROquel XR) 200 MG 24 hr tablet Take 1 tablet by mouth Every Night.     • Biotin 5000 MCG tablet Take  by mouth.     • lisinopril (PRINIVIL,ZESTRIL) 5 MG tablet Take 1 tablet by mouth Daily.     • nitroglycerin (NITROSTAT) 0.4 MG SL tablet 1 under the tongue as needed for angina, may repeat q5mins for up three doses 100 tablet 11     No current facility-administered medications for this visit.           ALLERGIES:  Patient has no known allergies.    Past Medical History:   Diagnosis Date   • Arthritis    • Chronic kidney disease    • H. pylori infection    • Hearing loss    • Hepatitis    • Hyperlipidemia    • Hypertension    • Low back pain    • Prostate cancer    • Seasonal allergies        Social History     Tobacco Use   Smoking Status Never   Smokeless Tobacco Never          Family History   Problem Relation Age of Onset   • Breast cancer Mother    • Heart disease Mother    • Heart disease Father    • COPD Father    • GI problems Father    • Heart disease Sister    • Lung cancer Sister    • Heart disease Sister    • Heart disease Brother    • Other (otthe) Other         grandparents medical history unknown   • COPD Son    • No Known Problems Son        Review of Systems   Constitutional: Positive for malaise/fatigue. Negative for decreased appetite.   HENT:  Negative for congestion and sore throat.    Eyes:  Negative for blurred vision, double vision and visual disturbance.   Cardiovascular:  Positive for chest pain, dyspnea on exertion and palpitations.   Respiratory:  Positive for shortness of breath. Negative for snoring.    Endocrine: Negative for cold intolerance and heat intolerance.  "  Hematologic/Lymphatic: Negative for adenopathy. Does not bruise/bleed easily.   Skin:  Negative for itching, nail changes and skin cancer.   Musculoskeletal:  Negative for arthritis and myalgias.   Gastrointestinal:  Negative for abdominal pain, dysphagia and heartburn.   Genitourinary:  Negative for bladder incontinence and frequency.   Neurological:  Negative for dizziness, seizures and vertigo.   Psychiatric/Behavioral:  Negative for altered mental status.    Allergic/Immunologic: Negative for environmental allergies and hives.     Diabetes- No  Thyroid- normal    Objective     /72 (BP Location: Right arm)   Pulse 61   Ht 182.9 cm (72\")   Wt 81.2 kg (179 lb)   BMI 24.28 kg/m²     Vitals and nursing note reviewed.   Constitutional:       Appearance: Healthy appearance. Not in distress.   Eyes:      Conjunctiva/sclera: Conjunctivae normal.      Pupils: Pupils are equal, round, and reactive to light.   HENT:      Head: Normocephalic.   Neck:      Vascular: Carotid bruit present.   Pulmonary:      Effort: Pulmonary effort is normal.      Breath sounds: Normal breath sounds.   Cardiovascular:      PMI at left midclavicular line. Normal rate. Regular rhythm.      Murmurs: There is a grade 3/6 high frequency blowing holosystolic murmur at the apex.   Abdominal:      General: Bowel sounds are normal.      Palpations: Abdomen is soft.   Musculoskeletal: Normal range of motion.      Cervical back: Normal range of motion and neck supple. Skin:     General: Skin is warm and dry.   Neurological:      Mental Status: Alert, oriented to person, place, and time and oriented to person, place and time.       ECG 12 Lead    Date/Time: 5/6/2025 3:57 PM  Performed by: Maryam Mahoney MD    Authorized by: Maryam Mahoney MD  Comparison: compared with previous ECG from 2/21/2015  Comparison to previous ECG: Q waves in the anterior leads also  Rhythm: sinus rhythm  Rate: normal  Q waves: V1, V2, V3 and V4    QRS " axis: normal    Clinical impression: abnormal EKG        @ASSESSMENT/PLAN@  BMI is within normal parameters. No other follow-up for BMI required.     Diagnoses and all orders for this visit:    1. Chest tightness (Primary)  -     nitroglycerin (NITROSTAT) 0.4 MG SL tablet; 1 under the tongue as needed for angina, may repeat q5mins for up three doses  Dispense: 100 tablet; Refill: 11  -     Stress Test With Myocardial Perfusion One Day; Future  -     High Sensitivity CRP; Future    2. Primary hypertension  -     Comprehensive Metabolic Panel; Future    3. Hypercholesterolemia  -     Lipid Panel; Future    4. Bilateral carotid artery stenosis  -     CBC & Differential; Future    5. Shortness of breath  -     Adult Transthoracic Echo Complete W/ Cont if Necessary Per Protocol; Future  -     proBNP; Future    At baseline his heart rate and blood pressure appears normal.  His EKG shows sinus rhythm with Q waves in the anteroseptal leads and nonspecific T wave changes.  His clinical examination reveals a BMI of 24.  He does have a carotid bruit on the left side.  His cardiovascular examination is unremarkable.    Regarding the chest tightness, it is little bit worrisome for coronary artery disease.  I do not have any old EKG to compare.  His symptoms seems to be anginal-like.  I gave him prescription for sublingual nitroglycerin to be used as needed.  He is advised to go to the emergency room.  I scheduled him to undergo a stress test as soon as possible and also advised him to check his CRP level    His blood pressure seems to be controlled with lisinopril and metoprolol.  Continue the same and monitor it closely and check the electrolytes and renal function again    His hypercholesterolemia is managed with low-dose of Lipitor.  I like to recheck the LFT and the lipid profile.  I to keep the LDL less than 70 if possible    Regarding his carotid artery disease the area where he has the surgery seems to be within normal  limit but there is a bruit on the left side.  I like to schedule him for a carotid ultrasound.  He claims that he already has it scheduled in the next 2 weeks.  I like to get a copy of the report when it is available    His shortness of breath could be related to cardiac.  I scheduled him to undergo an echocardiogram to evaluate the LV function, valvular structures and the PA pressure.  Also like to check his BNP level    Regarding advanced directive, talked to him about living will and power of  and gave him booklet regarding that    Based on the results, further investigation including cardiac catheterization will be done.  I did explain to him in detail about him.               Electronically signed by Maryam Mahoney MD May 6, 2025 15:56 EDT

## 2025-05-06 NOTE — LETTER
"May 6, 2025     David Frank MD  [address] [address]    Patient: Jurgen Rhodes   YOB: 1950   Date of Visit: 5/6/2025     Dear David Frank MD:       Thank you for referring Jurgen Rhodes to me for evaluation. Below are the relevant portions of my assessment and plan of care.    If you have questions, please do not hesitate to call me. I look forward to following Jurgen along with you.         Sincerely,        Maryam Mahoney MD        CC: No Recipients    Maryam Mahoney MD  05/06/25 [id number]  Sign when Signing Visit  Chief Complaint   Patient presents with   • Establish Care     PCP referred, chest pain.    • Chest Pain     Episode in February, had mold in his house. Washed it off with bleach. The next day was having a lot of chest pressure with SOB. Since then he has not been able to do any exertion without developing chest pressure that radiates to his left back and shoulder.  SOB noted.    • Shortness of Breath     Now occurring with any exertion.    • Cardiac history     Wore monitor in 2022, echo in 2015, results in chart. Several carotid US's. Results also in chart.    • Palpitations     Had been having a lot of palpitations a few years ago, PCP ordered a monitor and added metoprolol tart 25 mg BID. Really helped the palpitations.    • Labs     Last check was in Dec checking his kidneys that \" shut down\" several years at [facility]. Requesting results.    • Kidney Eval     History of problems with acute kidney failure a few years ago,  Reports under [facility].         CARDIAC COMPLAINTS  chest pressure/discomfort, dyspnea, and palpitations      Subjective  Jurgen Rhodes is a 75 y.o. male came in today for his initial cardiac evaluation.  He has history of hypertension, hypercholesterolemia who also has significant carotid artery disease diagnosed in 2015 when he was found to have severe carotid artery disease and underwent right carotid endarterectomy.  He " has been monitoring his carotid ultrasound at least once every other year.  In 2023 the left carotid artery disease seems to have gotten worse.  He has not had any symptoms from that at that time.  He also has been having episodes of palpitation on and off and a Holter monitor showed 2.9% PVC and few short runs of SVT.  Beta-blockers were added and has done fairly well with that.  He also had problem with kidney failure few years ago.  He apparently has been drinking water which was kept in the sun for a long time.  He claims that part because the renal failure but apparently the symptoms and the failure got better.    He is now referred because he has been having some chest tightness and shortness of breath.  He started in February when he was cleaning some mold in his house and washing it out with bleach.  The next day he was having a lot of chest tightness associated with shortness of breath.  He was not able to do any activities without developing the discomfort.  After about 30 minutes the symptoms subsided.  Since then he has been noticing the same symptoms but not as significant occurring 2 or 3 times a week.  It always occurs on exertion.  In a scale of 1-10 is around 4 of 5.  It normally last about 15 minutes and resolves with resting.  No associated diaphoresis noted.  He is not sure when was the last time he had any labs done.  The last that I have is from 2015.  He has no history of smoking or drinking alcohol.  His father and 2 sisters had some kind of heart disease and one of his brother has heart disease.    Past Surgical History:   Procedure Laterality Date   • CAROTID ENDARTERECTOMY  02/23/2015    (Dr. Piña) (R) carotid endarterectomy   • COLONOSCOPY     • CONVERTED (HISTORICAL) HOLTER  09/16/2022    @ [facility]. 1 day. Avg 67. 47- 120. 2.9% PVC. 4 SVT   • PROSTATE SURGERY  2007   •  CAROTID UNILATERAL  02/23/2015    @ [facility]- > 70% (R) ICA. < 49% (L) ICA   • US CAROTID UNILATERAL  03/13/2017    @ [facility]. <  49 % Both ICA   • US CAROTID UNILATERAL  02/18/2019    @ [facility]. < 49 %. Both ICA   • US CAROTID UNILATERAL  03/04/2021    @ [facility]. < 49 % Both ICA   • US CAROTID UNILATERAL  03/13/2023    @ [facility]. 50-69% (L) ICA       Current Outpatient Medications   Medication Sig Dispense Refill   • aspirin 81 MG EC tablet Take 1 tablet by mouth Daily.     • atorvastatin (LIPITOR) 10 MG tablet Take 1 tablet by mouth Daily.     • Cyanocobalamin (VITAMIN B-12 PO) Take  by mouth Daily.     • HYDROcodone-acetaminophen (NORCO) 7.5-325 MG per tablet TAKE ONE TABLET BY MOUTH THREE TIMES DAILY AS NEEDED  0   • loratadine (CLARITIN) 10 MG tablet Take 1 tablet by mouth Daily.     • MAGNESIUM PO Take  by mouth Daily.     • metoprolol tartrate (LOPRESSOR) 25 MG tablet Take 1 tablet by mouth 2 (Two) Times a Day.     • Misc Natural Products (BEET ROOT PO) Take  by mouth 2 (Two) Times a Day.     • QUEtiapine XR (SEROquel XR) 200 MG 24 hr tablet Take 1 tablet by mouth Every Night.     • Biotin 5000 MCG tablet Take  by mouth.     • lisinopril (PRINIVIL,ZESTRIL) 5 MG tablet Take 1 tablet by mouth Daily.     • nitroglycerin (NITROSTAT) 0.4 MG SL tablet 1 under the tongue as needed for angina, may repeat q5mins for up three doses 100 tablet 11     No current facility-administered medications for this visit.           ALLERGIES:  Patient has no known allergies.    Past Medical History:   Diagnosis Date   • Arthritis    • Chronic kidney disease    • H. pylori infection    • Hearing loss    • Hepatitis    • Hyperlipidemia    • Hypertension    • Low back pain    • Prostate cancer    • Seasonal allergies        Social History     Tobacco Use   Smoking Status Never   Smokeless Tobacco Never          Family History   Problem Relation Age of Onset   • Breast cancer Mother    • Heart disease Mother    • Heart disease Father    • COPD Father    • GI problems Father    • Heart disease Sister    • Lung cancer Sister    • Heart disease Sister    • Heart disease Brother  "   • Other (otthe) Other         grandparents medical history unknown   • COPD Son    • No Known Problems Son        Review of Systems   Constitutional: Positive for malaise/fatigue. Negative for decreased appetite.   HENT:  Negative for congestion and sore throat.    Eyes:  Negative for blurred vision, double vision and visual disturbance.   Cardiovascular:  Positive for chest pain, dyspnea on exertion and palpitations.   Respiratory:  Positive for shortness of breath. Negative for snoring.    Endocrine: Negative for cold intolerance and heat intolerance.   Hematologic/Lymphatic: Negative for adenopathy. Does not bruise/bleed easily.   Skin:  Negative for itching, nail changes and skin cancer.   Musculoskeletal:  Negative for arthritis and myalgias.   Gastrointestinal:  Negative for abdominal pain, dysphagia and heartburn.   Genitourinary:  Negative for bladder incontinence and frequency.   Neurological:  Negative for dizziness, seizures and vertigo.   Psychiatric/Behavioral:  Negative for altered mental status.    Allergic/Immunologic: Negative for environmental allergies and hives.     Diabetes- No  Thyroid- normal    Objective    /72 (BP Location: Right arm)   Pulse 61   Ht 182.9 cm (72\")   Wt 81.2 kg (179 lb)   BMI 24.28 kg/m²     Vitals and nursing note reviewed.   Constitutional:       Appearance: Healthy appearance. Not in distress.   Eyes:      Conjunctiva/sclera: Conjunctivae normal.      Pupils: Pupils are equal, round, and reactive to light.   HENT:      Head: Normocephalic.   Neck:      Vascular: Carotid bruit present.   Pulmonary:      Effort: Pulmonary effort is normal.      Breath sounds: Normal breath sounds.   Cardiovascular:      PMI at left midclavicular line. Normal rate. Regular rhythm.      Murmurs: There is a grade 3/6 high frequency blowing holosystolic murmur at the apex.   Abdominal:      General: Bowel sounds are normal.      Palpations: Abdomen is soft.   Musculoskeletal: " Normal range of motion.      Cervical back: Normal range of motion and neck supple. Skin:     General: Skin is warm and dry.   Neurological:      Mental Status: Alert, oriented to person, place, and time and oriented to person, place and time.       ECG 12 Lead    Date/Time: 5/6/2025 3:57 PM  Performed by: Maryam Mahoney MD    Authorized by: Maryam Mahoney MD  Comparison: compared with previous ECG from 2/21/2015  Comparison to previous ECG: Q waves in the anterior leads also  Rhythm: sinus rhythm  Rate: normal  Q waves: V1, V2, V3 and V4    QRS axis: normal    Clinical impression: abnormal EKG        @ASSESSMENT/PLAN@  BMI is within normal parameters. No other follow-up for BMI required.     Diagnoses and all orders for this visit:    1. Chest tightness (Primary)  -     nitroglycerin (NITROSTAT) 0.4 MG SL tablet; 1 under the tongue as needed for angina, may repeat q5mins for up three doses  Dispense: 100 tablet; Refill: 11  -     Stress Test With Myocardial Perfusion One Day; Future  -     High Sensitivity CRP; Future    2. Primary hypertension  -     Comprehensive Metabolic Panel; Future    3. Hypercholesterolemia  -     Lipid Panel; Future    4. Bilateral carotid artery stenosis  -     CBC & Differential; Future    5. Shortness of breath  -     Adult Transthoracic Echo Complete W/ Cont if Necessary Per Protocol; Future  -     proBNP; Future    At baseline his heart rate and blood pressure appears normal.  His EKG shows sinus rhythm with Q waves in the anteroseptal leads and nonspecific T wave changes.  His clinical examination reveals a BMI of 24.  He does have a carotid bruit on the left side.  His cardiovascular examination is unremarkable.    Regarding the chest tightness, it is little bit worrisome for coronary artery disease.  I do not have any old EKG to compare.  His symptoms seems to be anginal-like.  I gave him prescription for sublingual nitroglycerin to be used as needed.  He is advised to  go to the emergency room.  I scheduled him to undergo a stress test as soon as possible and also advised him to check his CRP level    His blood pressure seems to be controlled with lisinopril and metoprolol.  Continue the same and monitor it closely and check the electrolytes and renal function again    His hypercholesterolemia is managed with low-dose of Lipitor.  I like to recheck the LFT and the lipid profile.  I to keep the LDL less than 70 if possible    Regarding his carotid artery disease the area where he has the surgery seems to be within normal limit but there is a bruit on the left side.  I like to schedule him for a carotid ultrasound.  He claims that he already has it scheduled in the next 2 weeks.  I like to get a copy of the report when it is available    His shortness of breath could be related to cardiac.  I scheduled him to undergo an echocardiogram to evaluate the LV function, valvular structures and the PA pressure.  Also like to check his BNP level    Regarding advanced directive, talked to him about living will and power of  and gave him booklet regarding that    Based on the results, further investigation including cardiac catheterization will be done.  I did explain to him in detail about him.               Electronically signed by Maryam Mahoney MD May 6, 2025 15:56 EDT

## 2025-05-14 ENCOUNTER — HOSPITAL ENCOUNTER (OUTPATIENT)
Dept: CARDIOLOGY | Facility: HOSPITAL | Age: 75
End: 2025-05-14
Payer: MEDICARE

## 2025-05-14 ENCOUNTER — APPOINTMENT (OUTPATIENT)
Dept: CARDIOLOGY | Facility: HOSPITAL | Age: 75
End: 2025-05-14
Payer: MEDICARE

## 2025-05-14 ENCOUNTER — HOSPITAL ENCOUNTER (OUTPATIENT)
Dept: CARDIOLOGY | Facility: HOSPITAL | Age: 75
Discharge: HOME | End: 2025-05-14
Admitting: INTERNAL MEDICINE
Payer: MEDICARE

## 2025-05-14 VITALS — BODY MASS INDEX: 24.25 KG/M2 | HEIGHT: 72 IN | WEIGHT: 179.01 LBS

## 2025-05-14 DIAGNOSIS — R06.02 SHORTNESS OF BREATH: ICD-10-CM

## 2025-05-14 LAB
AORTIC DIMENSIONLESS INDEX: 0.83 (DI)
AV MEAN PRESS GRAD SYS DOP V1V2: 2.9 MMHG
AV VMAX SYS DOP: 113 CM/SEC
BH CV ECHO MEAS - ACS: 1.72 CM
BH CV ECHO MEAS - AO MAX PG: 5.1 MMHG
BH CV ECHO MEAS - AO ROOT DIAM: 3.3 CM
BH CV ECHO MEAS - AO V2 VTI: 24.8 CM
BH CV ECHO MEAS - AVA(I,D): 2.8 CM2
BH CV ECHO MEAS - EDV(CUBED): 104.1 ML
BH CV ECHO MEAS - ESV(CUBED): 36.5 ML
BH CV ECHO MEAS - FS: 29.5 %
BH CV ECHO MEAS - IVS/LVPW: 1.24 CM
BH CV ECHO MEAS - IVSD: 1.22 CM
BH CV ECHO MEAS - LA DIMENSION: 4.3 CM
BH CV ECHO MEAS - LAT PEAK E' VEL: 9.4 CM/SEC
BH CV ECHO MEAS - LV MASS(C)D: 188.2 GRAMS
BH CV ECHO MEAS - LV MAX PG: 3.5 MMHG
BH CV ECHO MEAS - LV MEAN PG: 1.43 MMHG
BH CV ECHO MEAS - LV V1 MAX: 93.9 CM/SEC
BH CV ECHO MEAS - LV V1 VTI: 20.6 CM
BH CV ECHO MEAS - LVIDD: 4.7 CM
BH CV ECHO MEAS - LVIDS: 3.3 CM
BH CV ECHO MEAS - LVOT AREA: 3.4 CM2
BH CV ECHO MEAS - LVOT DIAM: 2.09 CM
BH CV ECHO MEAS - LVPWD: 0.98 CM
BH CV ECHO MEAS - MED PEAK E' VEL: 5.7 CM/SEC
BH CV ECHO MEAS - MR MAX PG: 57.2 MMHG
BH CV ECHO MEAS - MR MAX VEL: 378.3 CM/SEC
BH CV ECHO MEAS - MV A MAX VEL: 37.5 CM/SEC
BH CV ECHO MEAS - MV DEC SLOPE: 341.5 CM/SEC2
BH CV ECHO MEAS - MV DEC TIME: 0.25 SEC
BH CV ECHO MEAS - MV E MAX VEL: 87.8 CM/SEC
BH CV ECHO MEAS - MV E/A: 2.34
BH CV ECHO MEAS - MV MAX PG: 3.2 MMHG
BH CV ECHO MEAS - MV MEAN PG: 1.31 MMHG
BH CV ECHO MEAS - MV P1/2T: 78.7 MSEC
BH CV ECHO MEAS - MV V2 VTI: 22.7 CM
BH CV ECHO MEAS - MVA(P1/2T): 2.8 CM2
BH CV ECHO MEAS - MVA(VTI): 3.1 CM2
BH CV ECHO MEAS - PA V2 MAX: 79.2 CM/SEC
BH CV ECHO MEAS - PI END-D VEL: 177.7 CM/SEC
BH CV ECHO MEAS - RAP SYSTOLE: 8 MMHG
BH CV ECHO MEAS - RV MAX PG: 1.06 MMHG
BH CV ECHO MEAS - RV V1 MAX: 51.5 CM/SEC
BH CV ECHO MEAS - RV V1 VTI: 13.5 CM
BH CV ECHO MEAS - RVDD: 3.5 CM
BH CV ECHO MEAS - RVSP: 43.2 MMHG
BH CV ECHO MEAS - SV(LVOT): 70.4 ML
BH CV ECHO MEAS - SVI(LVOT): 34.6 ML/M2
BH CV ECHO MEAS - TAPSE (>1.6): 2.16 CM
BH CV ECHO MEAS - TR MAX PG: 35.2 MMHG
BH CV ECHO MEAS - TR MAX VEL: 296.6 CM/SEC
BH CV ECHO MEASUREMENTS AVERAGE E/E' RATIO: 11.63
BH CV XLRA - RV BASE: 3.7 CM
BH CV XLRA - RV LENGTH: 7.7 CM
BH CV XLRA - RV MID: 2.21 CM
BH CV XLRA - TDI S': 11.8 CM/SEC
LV EF 2D ECHO EST: 56 %
LV EF 3D SEGMENTATION: 39 %
SINUS: 3.3 CM
STJ: 2.24 CM

## 2025-05-14 PROCEDURE — 93306 TTE W/DOPPLER COMPLETE: CPT

## 2025-05-14 PROCEDURE — 93356 MYOCRD STRAIN IMG SPCKL TRCK: CPT

## 2025-05-22 ENCOUNTER — LAB (OUTPATIENT)
Dept: LAB | Facility: HOSPITAL | Age: 75
End: 2025-05-22
Payer: MEDICARE

## 2025-05-22 ENCOUNTER — HOSPITAL ENCOUNTER (OUTPATIENT)
Dept: CARDIOLOGY | Facility: HOSPITAL | Age: 75
Discharge: HOME | End: 2025-05-22
Payer: MEDICARE

## 2025-05-22 DIAGNOSIS — E78.00 HYPERCHOLESTEROLEMIA: ICD-10-CM

## 2025-05-22 DIAGNOSIS — R07.89 CHEST TIGHTNESS: ICD-10-CM

## 2025-05-22 DIAGNOSIS — R06.02 SHORTNESS OF BREATH: ICD-10-CM

## 2025-05-22 DIAGNOSIS — I10 PRIMARY HYPERTENSION: ICD-10-CM

## 2025-05-22 DIAGNOSIS — I65.23 BILATERAL CAROTID ARTERY STENOSIS: ICD-10-CM

## 2025-05-22 LAB
ALBUMIN SERPL-MCNC: 3.9 G/DL (ref 3.5–5.2)
ALBUMIN/GLOB SERPL: 1.4 G/DL
ALP SERPL-CCNC: 47 U/L (ref 39–117)
ALT SERPL W P-5'-P-CCNC: 16 U/L (ref 1–41)
ANION GAP SERPL CALCULATED.3IONS-SCNC: 10.5 MMOL/L (ref 5–15)
AST SERPL-CCNC: 23 U/L (ref 1–40)
BASOPHILS # BLD AUTO: 0.06 10*3/MM3 (ref 0–0.2)
BASOPHILS NFR BLD AUTO: 0.9 % (ref 0–1.5)
BH CV REST NUCLEAR ISOTOPE DOSE: 10 MCI
BH CV STRESS DURATION MIN STAGE 1: 3
BH CV STRESS DURATION SEC STAGE 1: 0
BH CV STRESS GRADE STAGE 1: 10
BH CV STRESS METS STAGE 1: 5
BH CV STRESS NUCLEAR ISOTOPE DOSE: 30 MCI
BH CV STRESS PROTOCOL 1: NORMAL
BH CV STRESS RECOVERY BP: NORMAL MMHG
BH CV STRESS RECOVERY HR: 72 BPM
BH CV STRESS SPEED STAGE 1: 1.7
BH CV STRESS STAGE 1: 1
BILIRUB SERPL-MCNC: 0.5 MG/DL (ref 0–1.2)
BUN SERPL-MCNC: 16 MG/DL (ref 8–23)
BUN/CREAT SERPL: 14.4 (ref 7–25)
CALCIUM SPEC-SCNC: 9 MG/DL (ref 8.6–10.5)
CHLORIDE SERPL-SCNC: 105 MMOL/L (ref 98–107)
CHOLEST SERPL-MCNC: 131 MG/DL (ref 0–200)
CO2 SERPL-SCNC: 22.5 MMOL/L (ref 22–29)
CREAT SERPL-MCNC: 1.11 MG/DL (ref 0.76–1.27)
DEPRECATED RDW RBC AUTO: 46.6 FL (ref 37–54)
EGFRCR SERPLBLD CKD-EPI 2021: 69.2 ML/MIN/1.73
EOSINOPHIL # BLD AUTO: 0.53 10*3/MM3 (ref 0–0.4)
EOSINOPHIL NFR BLD AUTO: 8.3 % (ref 0.3–6.2)
ERYTHROCYTE [DISTWIDTH] IN BLOOD BY AUTOMATED COUNT: 12.4 % (ref 12.3–15.4)
GLOBULIN UR ELPH-MCNC: 2.8 GM/DL
GLUCOSE SERPL-MCNC: 78 MG/DL (ref 65–99)
HCT VFR BLD AUTO: 42.4 % (ref 37.5–51)
HDLC SERPL-MCNC: 32 MG/DL (ref 40–60)
HGB BLD-MCNC: 13.9 G/DL (ref 13–17.7)
IMM GRANULOCYTES # BLD AUTO: 0.06 10*3/MM3 (ref 0–0.05)
IMM GRANULOCYTES NFR BLD AUTO: 0.9 % (ref 0–0.5)
LDLC SERPL CALC-MCNC: 84 MG/DL (ref 0–100)
LDLC/HDLC SERPL: 2.64 {RATIO}
LYMPHOCYTES # BLD AUTO: 1.73 10*3/MM3 (ref 0.7–3.1)
LYMPHOCYTES NFR BLD AUTO: 27.2 % (ref 19.6–45.3)
MAXIMAL PREDICTED HEART RATE: 145 BPM
MCH RBC QN AUTO: 33.3 PG (ref 26.6–33)
MCHC RBC AUTO-ENTMCNC: 32.8 G/DL (ref 31.5–35.7)
MCV RBC AUTO: 101.4 FL (ref 79–97)
MONOCYTES # BLD AUTO: 0.74 10*3/MM3 (ref 0.1–0.9)
MONOCYTES NFR BLD AUTO: 11.6 % (ref 5–12)
NEUTROPHILS NFR BLD AUTO: 3.25 10*3/MM3 (ref 1.7–7)
NEUTROPHILS NFR BLD AUTO: 51.1 % (ref 42.7–76)
NRBC BLD AUTO-RTO: 0 /100 WBC (ref 0–0.2)
NT-PROBNP SERPL-MCNC: 1292 PG/ML (ref 0–1800)
PERCENT MAX PREDICTED HR: 80.69 %
PLATELET # BLD AUTO: 245 10*3/MM3 (ref 140–450)
PMV BLD AUTO: 10 FL (ref 6–12)
POTASSIUM SERPL-SCNC: 4.6 MMOL/L (ref 3.5–5.2)
PROT SERPL-MCNC: 6.7 G/DL (ref 6–8.5)
RBC # BLD AUTO: 4.18 10*6/MM3 (ref 4.14–5.8)
SODIUM SERPL-SCNC: 138 MMOL/L (ref 136–145)
SPECT HRT GATED+EF W RNC IV: 26 %
STRESS BASELINE BP: NORMAL MMHG
STRESS BASELINE HR: 60 BPM
STRESS PERCENT HR: 95 %
STRESS POST ESTIMATED WORKLOAD: 7 METS
STRESS POST EXERCISE DUR MIN: 4 MIN
STRESS POST EXERCISE DUR SEC: 40 SEC
STRESS POST PEAK BP: NORMAL MMHG
STRESS POST PEAK HR: 117 BPM
STRESS TARGET HR: 123 BPM
TRIGL SERPL-MCNC: 73 MG/DL (ref 0–150)
VLDLC SERPL-MCNC: 15 MG/DL (ref 5–40)
WBC NRBC COR # BLD AUTO: 6.37 10*3/MM3 (ref 3.4–10.8)

## 2025-05-22 PROCEDURE — 93017 CV STRESS TEST TRACING ONLY: CPT

## 2025-05-22 PROCEDURE — 78452 HT MUSCLE IMAGE SPECT MULT: CPT

## 2025-05-22 PROCEDURE — 86141 C-REACTIVE PROTEIN HS: CPT

## 2025-05-22 PROCEDURE — 34310000005 TECHNETIUM SESTAMIBI: Performed by: INTERNAL MEDICINE

## 2025-05-22 PROCEDURE — 85025 COMPLETE CBC W/AUTO DIFF WBC: CPT

## 2025-05-22 PROCEDURE — 36415 COLL VENOUS BLD VENIPUNCTURE: CPT

## 2025-05-22 PROCEDURE — 80061 LIPID PANEL: CPT

## 2025-05-22 PROCEDURE — 80053 COMPREHEN METABOLIC PANEL: CPT

## 2025-05-22 PROCEDURE — A9500 TC99M SESTAMIBI: HCPCS | Performed by: INTERNAL MEDICINE

## 2025-05-22 PROCEDURE — 83880 ASSAY OF NATRIURETIC PEPTIDE: CPT

## 2025-05-22 RX ADMIN — TECHNETIUM TC 99M SESTAMIBI 1 DOSE: 1 INJECTION INTRAVENOUS at 08:36

## 2025-05-22 RX ADMIN — TECHNETIUM TC 99M SESTAMIBI 1 DOSE: 1 INJECTION INTRAVENOUS at 09:44

## 2025-05-23 LAB — CRP SERPL-MCNC: 0.18 MG/DL (ref 0.01–0.5)

## 2025-05-27 ENCOUNTER — TELEPHONE (OUTPATIENT)
Dept: CARDIOLOGY | Facility: CLINIC | Age: 75
End: 2025-05-27
Payer: MEDICARE

## 2025-05-27 NOTE — TELEPHONE ENCOUNTER
Caller: Jurgen Rhodes    Relationship: Self    Best call back number: [phone]      What is the best time to reach you: ANY     What was the call regarding: PATIENT RETURNING CALL - NO VOICEMAIL OR DOCUMENTATION IN CHART. PLEASE CALL TO ADVISE    Is it okay if the provider responds through MyChart: PLEASE CALL

## 2025-05-28 ENCOUNTER — TELEPHONE (OUTPATIENT)
Dept: CARDIOLOGY | Facility: CLINIC | Age: 75
End: 2025-05-28
Payer: MEDICARE

## 2025-05-28 NOTE — TELEPHONE ENCOUNTER
I spoke with patient this morning.  He is going to come by office today to discuss test results and orders.

## 2025-05-28 NOTE — TELEPHONE ENCOUNTER
I spoke with Elana at [facility], patient's copay for Entresto is $169.44.  30 day free Entresto card info given to Elana.    Elana said the Farxiga script is going through for $0 copay at this time.

## 2025-06-05 ENCOUNTER — TELEPHONE (OUTPATIENT)
Dept: CARDIOLOGY | Facility: CLINIC | Age: 75
End: 2025-06-05
Payer: MEDICARE

## 2025-06-05 NOTE — TELEPHONE ENCOUNTER
Hub staff attempted to follow warm transfer process and was unsuccessful     Caller: [facility]    Relationship to patient:     Best call back number: [phone]    Patient is needing: ORDER NEED TO BE FAXED BY TOMORROW FOR LEFT SIDE CATH .[phone]

## 2025-06-06 ENCOUNTER — OUTSIDE FACILITY SERVICE (OUTPATIENT)
Dept: CARDIOLOGY | Facility: CLINIC | Age: 75
End: 2025-06-06
Payer: MEDICARE

## 2025-06-06 DIAGNOSIS — R07.89 CHEST TIGHTNESS: Primary | ICD-10-CM

## 2025-06-06 DIAGNOSIS — R94.39 ABNORMAL MYOCARDIAL PERFUSION STUDY: ICD-10-CM

## 2025-06-06 DIAGNOSIS — I25.10 CAD, MULTIPLE VESSEL: ICD-10-CM

## 2025-06-10 DIAGNOSIS — R07.89 CHEST TIGHTNESS: ICD-10-CM

## 2025-06-10 DIAGNOSIS — R06.02 SHORTNESS OF BREATH: ICD-10-CM

## 2025-06-10 DIAGNOSIS — R94.39 ABNORMAL MYOCARDIAL PERFUSION STUDY: ICD-10-CM

## 2025-07-01 NOTE — TELEPHONE ENCOUNTER
Spoke with patient who states that one of his medications is $169 and he cannot afford. He was unsure which one it was, but he was asking for it to be changed. I called and spoke with Myra at [facility]. She states that it is the Entresto 24-26 mg BID that costs patient $169, he is wanting that changed to something more affordable. He did  at pharmacy on 06/28/25 so he does have it now, but he will be unable to continue paying that. On patient's cath on 06/06/25, EF was 35%. What other recommendations do you have?

## 2025-07-02 NOTE — TELEPHONE ENCOUNTER
Patient was made aware that we could change Entresto to losartan 50 mg daily. Patient has picked up Entresto this week so I will send in losartan 50 mg daily in a month.

## 2025-07-28 RX ORDER — LOSARTAN POTASSIUM 50 MG/1
50 TABLET ORAL DAILY
Qty: 90 TABLET | Refills: 3 | Status: SHIPPED | OUTPATIENT
Start: 2025-07-28

## 2025-08-19 ENCOUNTER — TELEPHONE (OUTPATIENT)
Dept: CARDIOLOGY | Facility: CLINIC | Age: 75
End: 2025-08-19
Payer: MEDICARE